# Patient Record
Sex: MALE | Race: WHITE | NOT HISPANIC OR LATINO | Employment: OTHER | URBAN - METROPOLITAN AREA
[De-identification: names, ages, dates, MRNs, and addresses within clinical notes are randomized per-mention and may not be internally consistent; named-entity substitution may affect disease eponyms.]

---

## 2019-02-22 ENCOUNTER — HOSPITAL ENCOUNTER (EMERGENCY)
Facility: MEDICAL CENTER | Age: 69
End: 2019-02-22
Attending: EMERGENCY MEDICINE
Payer: MEDICARE

## 2019-02-22 ENCOUNTER — APPOINTMENT (OUTPATIENT)
Dept: RADIOLOGY | Facility: MEDICAL CENTER | Age: 69
End: 2019-02-22
Attending: EMERGENCY MEDICINE
Payer: MEDICARE

## 2019-02-22 VITALS
BODY MASS INDEX: 28.43 KG/M2 | WEIGHT: 233.47 LBS | OXYGEN SATURATION: 95 % | HEIGHT: 76 IN | DIASTOLIC BLOOD PRESSURE: 86 MMHG | RESPIRATION RATE: 19 BRPM | HEART RATE: 54 BPM | SYSTOLIC BLOOD PRESSURE: 151 MMHG | TEMPERATURE: 97 F

## 2019-02-22 DIAGNOSIS — R42 LIGHTHEADEDNESS: ICD-10-CM

## 2019-02-22 LAB
ALBUMIN SERPL BCP-MCNC: 4.3 G/DL (ref 3.2–4.9)
ALBUMIN/GLOB SERPL: 1.2 G/DL
ALP SERPL-CCNC: 60 U/L (ref 30–99)
ALT SERPL-CCNC: 17 U/L (ref 2–50)
ANION GAP SERPL CALC-SCNC: 10 MMOL/L (ref 0–11.9)
AST SERPL-CCNC: 17 U/L (ref 12–45)
BASOPHILS # BLD AUTO: 0.3 % (ref 0–1.8)
BASOPHILS # BLD: 0.04 K/UL (ref 0–0.12)
BILIRUB SERPL-MCNC: 0.6 MG/DL (ref 0.1–1.5)
BNP SERPL-MCNC: 152 PG/ML (ref 0–100)
BUN SERPL-MCNC: 27 MG/DL (ref 8–22)
CALCIUM SERPL-MCNC: 10.1 MG/DL (ref 8.5–10.5)
CHLORIDE SERPL-SCNC: 107 MMOL/L (ref 96–112)
CO2 SERPL-SCNC: 23 MMOL/L (ref 20–33)
CREAT SERPL-MCNC: 1.41 MG/DL (ref 0.5–1.4)
D DIMER PPP IA.FEU-MCNC: <0.4 UG/ML (FEU) (ref 0–0.5)
EKG IMPRESSION: NORMAL
EOSINOPHIL # BLD AUTO: 0.2 K/UL (ref 0–0.51)
EOSINOPHIL NFR BLD: 1.7 % (ref 0–6.9)
ERYTHROCYTE [DISTWIDTH] IN BLOOD BY AUTOMATED COUNT: 43.9 FL (ref 35.9–50)
GLOBULIN SER CALC-MCNC: 3.6 G/DL (ref 1.9–3.5)
GLUCOSE SERPL-MCNC: 110 MG/DL (ref 65–99)
HCT VFR BLD AUTO: 46.1 % (ref 42–52)
HGB BLD-MCNC: 16.1 G/DL (ref 14–18)
IMM GRANULOCYTES # BLD AUTO: 0.04 K/UL (ref 0–0.11)
IMM GRANULOCYTES NFR BLD AUTO: 0.3 % (ref 0–0.9)
LYMPHOCYTES # BLD AUTO: 2.05 K/UL (ref 1–4.8)
LYMPHOCYTES NFR BLD: 17.7 % (ref 22–41)
MCH RBC QN AUTO: 33.3 PG (ref 27–33)
MCHC RBC AUTO-ENTMCNC: 34.9 G/DL (ref 33.7–35.3)
MCV RBC AUTO: 95.4 FL (ref 81.4–97.8)
MONOCYTES # BLD AUTO: 0.99 K/UL (ref 0–0.85)
MONOCYTES NFR BLD AUTO: 8.5 % (ref 0–13.4)
NEUTROPHILS # BLD AUTO: 8.26 K/UL (ref 1.82–7.42)
NEUTROPHILS NFR BLD: 71.5 % (ref 44–72)
NRBC # BLD AUTO: 0 K/UL
NRBC BLD-RTO: 0 /100 WBC
PLATELET # BLD AUTO: 228 K/UL (ref 164–446)
PMV BLD AUTO: 10.6 FL (ref 9–12.9)
POTASSIUM SERPL-SCNC: 4.2 MMOL/L (ref 3.6–5.5)
PROT SERPL-MCNC: 7.9 G/DL (ref 6–8.2)
RBC # BLD AUTO: 4.83 M/UL (ref 4.7–6.1)
SODIUM SERPL-SCNC: 140 MMOL/L (ref 135–145)
TROPONIN I SERPL-MCNC: <0.01 NG/ML (ref 0–0.04)
TSH SERPL DL<=0.005 MIU/L-ACNC: 1.66 UIU/ML (ref 0.38–5.33)
WBC # BLD AUTO: 11.6 K/UL (ref 4.8–10.8)

## 2019-02-22 PROCEDURE — 36415 COLL VENOUS BLD VENIPUNCTURE: CPT

## 2019-02-22 PROCEDURE — 93005 ELECTROCARDIOGRAM TRACING: CPT | Performed by: EMERGENCY MEDICINE

## 2019-02-22 PROCEDURE — 80053 COMPREHEN METABOLIC PANEL: CPT

## 2019-02-22 PROCEDURE — 99284 EMERGENCY DEPT VISIT MOD MDM: CPT

## 2019-02-22 PROCEDURE — 85025 COMPLETE CBC W/AUTO DIFF WBC: CPT

## 2019-02-22 PROCEDURE — 83880 ASSAY OF NATRIURETIC PEPTIDE: CPT

## 2019-02-22 PROCEDURE — 93005 ELECTROCARDIOGRAM TRACING: CPT

## 2019-02-22 PROCEDURE — 84484 ASSAY OF TROPONIN QUANT: CPT

## 2019-02-22 PROCEDURE — 84443 ASSAY THYROID STIM HORMONE: CPT

## 2019-02-22 PROCEDURE — 71045 X-RAY EXAM CHEST 1 VIEW: CPT

## 2019-02-22 PROCEDURE — 85379 FIBRIN DEGRADATION QUANT: CPT

## 2019-02-22 RX ORDER — BISOPROLOL FUMARATE 10 MG/1
10 TABLET, FILM COATED ORAL DAILY
COMMUNITY

## 2019-02-22 RX ORDER — RANITIDINE 150 MG/1
150 TABLET ORAL DAILY
COMMUNITY

## 2019-02-22 RX ORDER — ATORVASTATIN CALCIUM 20 MG/1
20 TABLET, FILM COATED ORAL EVERY EVENING
COMMUNITY

## 2019-02-22 ASSESSMENT — LIFESTYLE VARIABLES: DO YOU DRINK ALCOHOL: NO

## 2019-02-22 NOTE — ED TRIAGE NOTES
Chief Complaint   Patient presents with   • Shortness of Breath     x 1 week.    • Dizziness     intermittent dizziness. states feels like sometimes his heart feels like its pounding and heart rate beats low. has hx of afib and cardiac bypass.     Pt aaox4. Skin pwd. NAD. Instructed to notify staff for any worsening symptoms.

## 2019-02-22 NOTE — ED NOTES
Reviewed discharge instructions, pt verbalized understanding of instructions. States he will schedule follow-up appointment for when he gets home. Denies further questions at this time. Pt ambulatory out of ER with steady gait.

## 2019-02-22 NOTE — ED NOTES
Rounded on pt. Resting in bed, NAD noted. Remains sinus priti on monitor. Wife at bedside. Remains with dizziness.

## 2019-02-22 NOTE — ED NOTES
Pt to rm 5. Agree with triage note. Pt changed into gown and connected to monitor. PIV initiated, blood drawn and sent to lab.

## 2019-02-22 NOTE — DISCHARGE INSTRUCTIONS
Call your doctor today and arrange office follow-up as soon as you get home.  If you have recurrent or prolonged episodes go to the nearest emergency department for recheck.

## 2019-02-22 NOTE — ED PROVIDER NOTES
ED Provider Note    CHIEF COMPLAINT  Chief Complaint   Patient presents with   • Shortness of Breath     x 1 week.    • Dizziness     intermittent dizziness. states feels like sometimes his heart feels like its pounding and heart rate beats low. has hx of afib and cardiac bypass.       HPI  Justo Alejandro is a 68 y.o. male who presents to the emergency department complaining of episodes of lightheadedness shortness of breath and generally not feeling well.  The patient has had several episodes like this recently, he does not recognize any specific precipitating event.  He has a history of asthma but he says this does not feel like asthma.  Symptoms usually resolve after just a couple of minutes.  The patient does have a history of atrial fibrillation but he says that the symptoms do not exactly feel like his episodes of atrial fibrillation.  He is also had a history of cardiac bypass surgery but is really not having any chest pain associated with this.  The patient is currently traveling from [place] to his home in [facility].    REVIEW OF SYSTEMS no hemoptysis no fever or chills no chest pain no back pain.  No loss of consciousness.  All other systems negative    PAST MEDICAL HISTORY  Past Medical History:   Diagnosis Date   • Asthma    • Atrial fibrillation (HCC)     4 months in 2016   • Hx of CABG    • Hypertension        FAMILY HISTORY  History reviewed. No pertinent family history.    SOCIAL HISTORY  Social History     Social History   • Marital status:      Spouse name: N/A   • Number of children: N/A   • Years of education: N/A     Social History Main Topics   • Smoking status: Former Smoker   • Smokeless tobacco: Never Used   • Alcohol use Yes      Comment: occ   • Drug use: No   • Sexual activity: Not on file     Other Topics Concern   • Not on file     Social History Narrative   • No narrative on file       SURGICAL HISTORY  History reviewed. No pertinent surgical history.    CURRENT  "MEDICATIONS  Home Medications     Reviewed by Amos Mckay R.N. (Registered Nurse) on 02/22/19 at 0924  Med List Status: Partial   Medication Last Dose Status   aspirin 81 MG tablet 2/21/2019 Active   atorvastatin (LIPITOR) 20 MG Tab 2/21/2019 Active   bisoprolol (ZEBETA) 10 MG tablet 2/22/2019 Active   Cholecalciferol (VITAMIN D3 PO) 2/22/2019 Active   Probiotic Product (PROBIOTIC DAILY PO) 2/22/2019 Active   raNITidine (ZANTAC) 150 MG Tab 2/21/2019 Active                ALLERGIES  Allergies   Allergen Reactions   • Codeine Rash     rash   • Nsaids      Messed up w/kidney function       PHYSICAL EXAM  VITAL SIGNS: /86   Pulse (!) 54   Temp 36.1 °C (97 °F) (Temporal)   Resp 19   Ht 1.93 m (6' 4\")   Wt 105.9 kg (233 lb 7.5 oz)   SpO2 95%   BMI 28.42 kg/m²    Oxygen saturation is interpreted as adequate  Constitutional: Awake verbal nontoxic-appearing  HENT: Mucous membranes are moist and throat clear  Eyes: No erythema discharge or jaundice  Neck: Trachea midline no JVD  Cardiovascular: Regular rate and rhythm  Lungs: Distant bilaterally but no apparent difficulty breathing  Abdomen/Back: Soft nondistended nontender no rebound guarding or peritoneal findings  Skin: Warm and dry  Musculoskeletal: No leg edema or calf tenderness  Neurologic: Awake verbal ambulatory carrying on a complete lucid conversation with a clear voice and no difficulty    Laboratory  CBC shows white blood cell count of 11.6 hemoglobin is adequate at 16.1 basic metabolic panel is generally unremarkable LFTs are unremarkable troponin is normal d-dimer is normal TSH is normal    EKG interpretation  Twelve-lead EKG shows sinus rhythm 58 bpm there is no pathologic ST elevation or depression or ectopy there is a flattened slightly downward T waves in lead III only    Radiology  DX-CHEST-PORTABLE (1 VIEW)   Final Result      No consolidation.            MEDICAL DECISION MAKING and DISPOSITION  In the emergency department an IV was " established the patient was placed on the cardiac monitor and he essentially remains asymptomatic during his emergency department stay.  I reviewed all the above information with the patient and his family and at this point in time I think that it would be beneficial for the patient to follow-up with his doctor and have a Holter monitor since he has had these symptoms episodically.  The patient does not want to be admitted to the hospital he wants to go home I recommended that he not drive, his wife will be able to drive home and he is to call his doctor today and arrange office recheck for as soon as he gets home.  If he has recurrent or new or worsening symptoms while traveling he is to go to the closest emergency department for recheck    IMPRESSION  1.  Episodes of lightheadedness of unknown etiology      Electronically signed by: Kt Mary, 2/22/2019 2:51 PM